# Patient Record
Sex: FEMALE | URBAN - METROPOLITAN AREA
[De-identification: names, ages, dates, MRNs, and addresses within clinical notes are randomized per-mention and may not be internally consistent; named-entity substitution may affect disease eponyms.]

---

## 2017-03-07 NOTE — PATIENT DISCUSSION
POAG, OU: s/p SLT  OU. INTRAOCULAR PRESSURE IS WITHIN ACCEPTABLE LIMITS. PT INSTRUCTED TO CONTINUE NO DROP THERAPY AT THIS TIME.  RETURN FOR FOLLOW-UP AS SCHEDULED

## 2017-07-25 NOTE — PATIENT DISCUSSION
Non Proliferative Diabetic  Counseling:  I have discussed with the patient the importance of controlling blood glucose, blood pressure and lipid levels to minimize the risk of progressing retinal complications from diabetes. I explained the importance of annual dilated eye exams because effective treatment of retinal complications depends on timely intervention. The patient was instructed to call or return sooner if they noticed blurred or distorted vision, fluctuating vision, pain or redness around one or both eyes. Return for follow-up as scheduled.

## 2017-09-19 ENCOUNTER — IMPORTED ENCOUNTER (OUTPATIENT)
Dept: URBAN - METROPOLITAN AREA CLINIC 9 | Facility: CLINIC | Age: 72
End: 2017-09-19

## 2017-11-09 ENCOUNTER — IMPORTED ENCOUNTER (OUTPATIENT)
Dept: URBAN - METROPOLITAN AREA CLINIC 9 | Facility: CLINIC | Age: 72
End: 2017-11-09

## 2017-11-16 ENCOUNTER — IMPORTED ENCOUNTER (OUTPATIENT)
Dept: URBAN - METROPOLITAN AREA CLINIC 9 | Facility: CLINIC | Age: 72
End: 2017-11-16

## 2017-11-20 ENCOUNTER — IMPORTED ENCOUNTER (OUTPATIENT)
Dept: URBAN - METROPOLITAN AREA CLINIC 9 | Facility: CLINIC | Age: 72
End: 2017-11-20

## 2017-12-07 ENCOUNTER — IMPORTED ENCOUNTER (OUTPATIENT)
Dept: URBAN - METROPOLITAN AREA CLINIC 9 | Facility: CLINIC | Age: 72
End: 2017-12-07

## 2018-01-26 NOTE — PATIENT DISCUSSION
MILD NONPROLIFERATIVE DIABETIC RETINOPATHY:ENCOURAGE GOOD BLOOD SUGAR AND BLOOD PRESSURE CONTROL.  RETURN FOR FOLLOW-UP AS SCHEDULED FOR DILATED FUNDUS EXAM.

## 2019-01-07 NOTE — PATIENT DISCUSSION
POAG, OU: s/p SLT  OU. INTRAOCULAR PRESSURE IS WITHIN ACCEPTABLE LIMITS. PT INSTRUCTED TO CONTINUE NO DROP THERAPY AT THIS TIME.  DISCUSSED LIMITED RELIABILITY OF VF AND RNFL WITH PT. RETURN FOR FOLLOW-UP AS SCHEDULED

## 2020-03-06 NOTE — PATIENT DISCUSSION
POAG, OU: s/p SLT  OU. ELEVATED IOP. PT INSTRUCTED TO REVIEWED VF AND RNFL WITH PT. DISCUSSED ADDING GTTS VS REPEAT SLT. PT WISHES TO REPEAT SLT OD THEN OS.  RETURN FOR FOLLOW-UP AS SCHEDULED

## 2021-10-16 ASSESSMENT — VISUAL ACUITY
OD_CC: 20/25 SN
OS_CC: 20/25 SN
OS_CC: 20/40 SN
OD_SC: 20/25 ET
OD_CC: 20/25 SN
OD_CC: 20/40 SN
OS_SC: 20/200 SN
OD_SC: 20/40 SN
OS_PH: 20/40 SN
OS_CC: 20/25 SN
OS_SC: 20/20 - ET
OD_CC: 20/25 SN
OD_SC: 20/60 SN
OS_SC: 20/60 SN

## 2021-10-16 ASSESSMENT — KERATOMETRY
OD_K1POWER_DIOPTERS: 43.25
OS_AXISANGLE_DEGREES: 65
OD_AXISANGLE2_DEGREES: 23
OD_AXISANGLE_DEGREES: 114
OD_AXISANGLE_DEGREES: 113
OS_AXISANGLE_DEGREES: 56
OS_K2POWER_DIOPTERS: 45.25
OS_K2POWER_DIOPTERS: 45.25
OD_AXISANGLE2_DEGREES: 24
OS_AXISANGLE2_DEGREES: 146
OS_K1POWER_DIOPTERS: 43.25
OD_K2POWER_DIOPTERS: 44.75
OD_K1POWER_DIOPTERS: 43.5
OS_K1POWER_DIOPTERS: 44
OS_AXISANGLE2_DEGREES: 155
OD_K2POWER_DIOPTERS: 45

## 2021-10-16 ASSESSMENT — TONOMETRY
OD_IOP_MMHG: 16
OS_IOP_MMHG: 12
OS_IOP_MMHG: 16
OD_IOP_MMHG: 16
OS_IOP_MMHG: 16
OS_IOP_MMHG: 18
OD_IOP_MMHG: 18